# Patient Record
Sex: MALE | Race: OTHER
[De-identification: names, ages, dates, MRNs, and addresses within clinical notes are randomized per-mention and may not be internally consistent; named-entity substitution may affect disease eponyms.]

---

## 2019-08-05 ENCOUNTER — HOSPITAL ENCOUNTER (INPATIENT)
Dept: HOSPITAL 74 - JER | Age: 24
LOS: 1 days | Discharge: HOME | DRG: 225 | End: 2019-08-06
Payer: MEDICARE

## 2019-08-05 VITALS — BODY MASS INDEX: 20.7 KG/M2

## 2019-08-05 DIAGNOSIS — D72.829: ICD-10-CM

## 2019-08-05 DIAGNOSIS — R18.8: ICD-10-CM

## 2019-08-05 DIAGNOSIS — D72.825: ICD-10-CM

## 2019-08-05 DIAGNOSIS — R10.31: ICD-10-CM

## 2019-08-05 DIAGNOSIS — K35.33: Primary | ICD-10-CM

## 2019-08-05 LAB
ALBUMIN SERPL-MCNC: 4.3 G/DL (ref 3.4–5)
ALP SERPL-CCNC: 69 U/L (ref 45–117)
ALT SERPL-CCNC: 17 U/L (ref 13–61)
ANION GAP SERPL CALC-SCNC: 5 MMOL/L (ref 8–16)
APPEARANCE UR: CLEAR
AST SERPL-CCNC: 13 U/L (ref 15–37)
BACTERIA # UR AUTO: 2.7 /HPF
BASOPHILS # BLD: 0.3 % (ref 0–2)
BILIRUB SERPL-MCNC: 0.9 MG/DL (ref 0.2–1)
BILIRUB UR STRIP.AUTO-MCNC: NEGATIVE MG/DL
BUN SERPL-MCNC: 9.7 MG/DL (ref 7–18)
CALCIUM SERPL-MCNC: 9.4 MG/DL (ref 8.5–10.1)
CASTS URNS QL MICRO: 20 /LPF (ref 0–8)
CHLORIDE SERPL-SCNC: 104 MMOL/L (ref 98–107)
CO2 SERPL-SCNC: 30 MMOL/L (ref 21–32)
COLOR UR: YELLOW
CREAT SERPL-MCNC: 1 MG/DL (ref 0.55–1.3)
DEPRECATED RDW RBC AUTO: 12.6 % (ref 11.9–15.9)
EOSINOPHIL # BLD: 0 % (ref 0–4.5)
EPITH CASTS URNS QL MICRO: 3.3 /HPF
GLUCOSE SERPL-MCNC: 132 MG/DL (ref 74–106)
HCT VFR BLD CALC: 40.3 % (ref 35.4–49)
HGB BLD-MCNC: 13.9 GM/DL (ref 11.7–16.9)
INR BLD: 1.13 (ref 0.83–1.09)
KETONES UR QL STRIP: (no result)
LEUKOCYTE ESTERASE UR QL STRIP.AUTO: NEGATIVE
LYMPHOCYTES # BLD: 3.1 % (ref 8–40)
MCH RBC QN AUTO: 30.4 PG (ref 25.7–33.7)
MCHC RBC AUTO-ENTMCNC: 34.6 G/DL (ref 32–35.9)
MCV RBC: 87.9 FL (ref 80–96)
MONOCYTES # BLD AUTO: 6.1 % (ref 3.8–10.2)
NEUTROPHILS # BLD: 90.5 % (ref 42.8–82.8)
NITRITE UR QL STRIP: NEGATIVE
PH UR: >= 9 [PH] (ref 5–8)
PLATELET # BLD AUTO: 209 K/MM3 (ref 134–434)
PMV BLD: 8.7 FL (ref 7.5–11.1)
POTASSIUM SERPLBLD-SCNC: 4.6 MMOL/L (ref 3.5–5.1)
PROT SERPL-MCNC: 7.5 G/DL (ref 6.4–8.2)
PROT UR QL STRIP: (no result)
PROT UR QL STRIP: (no result)
PT PNL PPP: 13.3 SEC (ref 9.7–13)
RBC # BLD AUTO: 1 /HPF (ref 0–4)
RBC # BLD AUTO: 4.58 M/MM3 (ref 4–5.6)
SODIUM SERPL-SCNC: 139 MMOL/L (ref 136–145)
SP GR UR: 1.03 (ref 1.01–1.03)
UROBILINOGEN UR STRIP-MCNC: 1 MG/DL (ref 0.2–1)
WBC # BLD AUTO: 16.2 K/MM3 (ref 4–10)
WBC # UR AUTO: 1 /HPF (ref 0–5)

## 2019-08-05 PROCEDURE — 0DTJ8ZZ RESECTION OF APPENDIX, VIA NATURAL OR ARTIFICIAL OPENING ENDOSCOPIC: ICD-10-PCS

## 2019-08-05 RX ADMIN — SODIUM CHLORIDE, POTASSIUM CHLORIDE, SODIUM LACTATE AND CALCIUM CHLORIDE SCH MLS: 600; 310; 30; 20 INJECTION, SOLUTION INTRAVENOUS at 14:02

## 2019-08-05 RX ADMIN — SODIUM CHLORIDE, POTASSIUM CHLORIDE, SODIUM LACTATE AND CALCIUM CHLORIDE SCH MLS/HR: 600; 310; 30; 20 INJECTION, SOLUTION INTRAVENOUS at 09:09

## 2019-08-05 RX ADMIN — SODIUM CHLORIDE, POTASSIUM CHLORIDE, SODIUM LACTATE AND CALCIUM CHLORIDE SCH: 600; 310; 30; 20 INJECTION, SOLUTION INTRAVENOUS at 21:18

## 2019-08-05 NOTE — OP
Operative Note





- Note:


Operative Date: 08/05/19


Pre-Operative Diagnosis: acute appendicistis with localized peritonitis


Operation: laparoscopic appendectomy


Findings: 





inflammed appendix with purulent exudate 


Post-Operative Diagnosis: Same as Pre-op


Surgeon: Cameron Chong


Anesthesiologist/CRNA: Amparo Hilliard MD


Anesthesia: General, Local (0.5% marcaine )


Specimens Removed: appendix


Estimated Blood Loss (mls): 4


Drains, Volume Out (mls): 10 (simpson (removed))


Fluid Volume Replaced (mls): 400


Operative Report Dictated: Yes

## 2019-08-05 NOTE — PDOC
*Physical Exam





- Vital Signs


 Last Vital Signs











Temp Pulse Resp BP Pulse Ox


 


 98.5 F   110 H  17   135/87   100 


 


 08/05/19 05:38  08/05/19 05:38  08/05/19 05:38  08/05/19 05:38  08/05/19 05:38














- Physical Exam


General Appearance: Yes: Nourished, Appropriately Dressed.  No: Apparent 

Distress, Disheveled


HEENT: positive: EOMI, Normal Voice, Symmetrical


Neck: positive: Trachea midline.  negative: Tender, Carotid bruit


Respiratory/Chest: positive: Lungs Clear, Normal Breath Sounds.  negative: 

Chest Tender, Respiratory Distress, Accessory Muscle Use


Cardiovascular: positive: Regular Rhythm, Regular Rate, S1, S2.  negative: 

Murmur


Vascular Pulses: Dorsalis-Pedis (R): 2+, Doralis-Pedis (L): 2+


Gastrointestinal/Abdominal: positive: Normal Bowel Sounds, Flat, Soft, Other (

no rebound on my exam s/p toradol and IVF).  negative: Tender, Organomegaly, 

Pulsatile Mass, Guarding


Musculoskeletal: positive: Normal Inspection.  negative: CVA Tenderness


Extremity: positive: Normal Capillary Refill, Normal Inspection, Normal Range 

of Motion


Integumentary: positive: Normal Color, Dry, Warm.  negative: Cyanotic, Erythema

, Jaundice, Rash


Neurologic: positive: CNs II-XII NML intact, Fully Oriented, Alert, Normal 

Response, Motor Strength 5/5





ED Treatment Course





- LABORATORY


CBC & Chemistry Diagram: 


 08/05/19 06:20





 08/05/19 06:20





- ADDITIONAL ORDERS


Additional order review: 


 Laboratory  Results











  08/05/19





  06:20


 


Sodium  139


 


Potassium  4.6


 


Chloride  104


 


Carbon Dioxide  30


 


Anion Gap  5 L


 


BUN  9.7


 


Creatinine  1.0


 


Est GFR (CKD-EPI)AfAm  123.27


 


Est GFR (CKD-EPI)NonAf  106.36


 


Random Glucose  132 H


 


Calcium  9.4


 


Total Bilirubin  0.9


 


AST  13 L


 


ALT  17


 


Alkaline Phosphatase  69


 


Total Protein  7.5


 


Albumin  4.3








 











  08/05/19





  06:20


 


RBC  4.58


 


MCV  87.9


 


MCHC  34.6


 


RDW  12.6


 


MPV  8.7


 


Neutrophils %  90.5 H


 


Lymphocytes %  3.1 L


 


Monocytes %  6.1


 


Eosinophils %  0.0


 


Basophils %  0.3














- Medications


Given in the ED: 


ED Medications














Discontinued Medications














Generic Name Dose Route Start Last Admin





  Trade Name Freq  PRN Reason Stop Dose Admin


 


Acetaminophen  1,000 mg  08/05/19 06:08  08/05/19 06:29





  Ofirmev Injection -  IVPB  08/05/19 06:09  Not Given





  ONCE ONE   





     





     





     





     


 


Sodium Chloride  1,000 mls @ 1,000 mls/hr  08/05/19 06:08  08/05/19 06:28





  Normal Saline -  IV  08/05/19 07:07  1,000 mls/hr





  ASDIR STA   Administration





     





     





     





     


 


Ketorolac Tromethamine  30 mg  08/05/19 06:10  08/05/19 06:28





  Toradol Injection -  IVPUSH  08/05/19 06:11  30 mg





  ONCE ONE   Administration





     





     





     





     














Medical Decision Making





- Medical Decision Making





08/05/19 07:19


22y/o man with no significant pmh p/w 1 day of cramping, non-radiating, 

abdominal epigastric pain and n/v. 


-Sign out received from Dr. Andrews


-Baseline exam: +rebound, tachy to 110





-CBC with leukocytosis (16.2)


-CMP / Liver Studies / Glucose wnl / Cr 1.0


-Urine pending 


-CTAP w/contrast pending








08/05/19 07:34


-Pt without tenderness / rebound on my exam s/p medication and fluids. 

Endorsing some hunger. No nausea or vomiting at this time. 


-Headed to CT








08/05/19 08:27


-CTAP with appendicitis, possible perforation


-Zosyn 4.5 given


-Last meal 3AM, confirmed no medications


-NPO


-PT/INR, T&S, EKG ordered





08/05/19 08:33


-Spoke to Dr. Chong with surgery





08/05/19 08:36


-EKG NSR, Tachycardic to 106, Normal axis, QTc 443, Normal morphologies without 

ischemic changes











*DC/Admit/Observation/Transfer


Diagnosis at time of Disposition: 


Appendicitis


Qualifiers:


 Appendicitis type: acute appendicitis Acute appendicitis type: unspecified 

acute appendicitis type Qualified Code(s): K35.80 - Unspecified acute 

appendicitis








- Discharge Dispostion


Condition at time of disposition: Guarded


Decision to Admit order: Yes





- Referrals





- Patient Instructions





- Post Discharge Activity

## 2019-08-05 NOTE — EKG
Test Reason : 

Blood Pressure : ***/*** mmHG

Vent. Rate : 106 BPM     Atrial Rate : 106 BPM

   P-R Int : 144 ms          QRS Dur : 092 ms

    QT Int : 334 ms       P-R-T Axes : 071 051 063 degrees

   QTc Int : 443 ms

 

SINUS TACHYCARDIA

OTHERWISE NORMAL ECG

NO PREVIOUS ECGS AVAILABLE

Confirmed by VALDO HEBERT MD ([id number]) on 8/5/2019 11:48:52 AM

 

Referred By:             Confirmed By:VALDO HEBERT MD

## 2019-08-05 NOTE — PDOC
History of Present Illness





- General


Chief Complaint: Nausea/Vomiting


Stated Complaint: ABDOMINAL PAIN





- History of Present Illness


Initial Comments: 





08/05/19 06:06





24y/o M with no significant. hx presenting to ED with 1 day of cramping 

abdominal epigastric pain. He describes it as non-radiating pain that began 

yesterday morning. The pain last for 5-10 minutes and is relieved by 

vomiting.He has had 3-4 episodes of non-bloody emesis and has been unable to 

keep food down. His last bowel movement was yesterday at 5pm. He denies any 

urinary symptoms (dysuria, hematuria,urgency, frequency), diarrhea, bloody 

stools, fevers, chills, back pain.





08/05/19 06:11





08/05/19 06:30








Past History





- Past Medical History


Allergies/Adverse Reactions: 


 Allergies











Allergy/AdvReac Type Severity Reaction Status Date / Time


 


No Known Allergies Allergy   Verified 08/05/19 05:40











Home Medications: 


Ambulatory Orders





Amox-Tr/K Cl [Augmentin - 875Mg Tablet] 1 tab PO BID #14 tablet 08/05/19 


Oxycodone HCl/Acetaminophen [Percocet 5/325 -] 1 tab PO Q6H #40 tab MDD 5 08/05/ 19 








COPD: No


Other medical history: DENIES





- Immunization History


Immunization Up to Date: Yes





- Suicide/Smoking/Psychosocial Hx


Smoking History: Never smoked


Have you smoked in the past 12 months: No


Information on smoking cessation initiated: No


Hx Alcohol Use: No


Drug/Substance Use Hx: No





**Review of Systems





- Review of Systems


All Other Systems: Reviewed and Negative





*Physical Exam





- Vital Signs


 Last Vital Signs











Temp Pulse Resp BP Pulse Ox


 


 98.5 F   110 H  17   135/87   100 


 


 08/05/19 05:38  08/05/19 05:38  08/05/19 05:38  08/05/19 05:38  08/05/19 05:38














- Physical Exam


General Appearance: Yes: Nourished, Appropriately Dressed.  No: Apparent 

Distress


HEENT: negative: Scleral Icterus (R), Scleral Icterus (L)


Neck: positive: Trachea midline, Supple.  negative: Tender


Respiratory/Chest: positive: Lungs Clear, Normal Breath Sounds.  negative: 

Chest Tender, Respiratory Distress, Accessory Muscle Use, Labored Respiration, 

Wheezing


Cardiovascular: positive: Regular Rhythm, Regular Rate, S1, S2.  negative: Edema

, JVD


Vascular Pulses: Dorsalis-Pedis (R): 2+, Doralis-Pedis (L): 2+


Gastrointestinal/Abdominal: positive: Normal Bowel Sounds, Soft, Rebound.  

negative: Protuberent, Distended, Guarding, Tenderness


Musculoskeletal: positive: Normal Inspection.  negative: CVA Tenderness, 

Decreased Range of Motion


Extremity: positive: Normal Capillary Refill, Normal Inspection, Normal Range 

of Motion.  negative: Pedal Edema


Integumentary: positive: Normal Color, Dry, Warm


Neurologic: positive: Fully Oriented, Alert, Normal Mood/Affect, Normal Response





ED Treatment Course





- LABORATORY


CBC & Chemistry Diagram: 


 08/05/19 06:20





 08/05/19 06:20





Medical Decision Making





- Medical Decision Making





08/05/19 06:18





24y/o M with no significant. hx presenting to ED with 1 day of cramping 

abdominal epigastric pain.





Ddx: Appendicitis vs Nephrolithiasis vs gastroenteritis





-Fluids (IV normal saline)


-cbc,cmp, ua


-CT abdomen and pelvis with contrast.








08/05/19 06:30











08/05/19 06:56








*DC/Admit/Observation/Transfer


Diagnosis at time of Disposition: 


Appendicitis


Qualifiers:


 Appendicitis type: acute appendicitis Acute appendicitis type: unspecified 

acute appendicitis type Qualified Code(s): K35.80 - Unspecified acute 

appendicitis








- Discharge Dispostion


Disposition: HOME


Condition at time of disposition: Good





- Referrals





- Patient Instructions





- Post Discharge Activity

## 2019-08-05 NOTE — HP
Admitting History and Physical





- Admission


Chief Complaint: abdominal pain


History of Present Illness: 





24yo male with no significant PMH presented to ED with 1 day of cramping 

abdominal epigastric pain. He describes it as non-radiating pain that began 

yesterday morning. The pain last for 5-10 minutes and is relieved by 

vomiting.He has had 3-4 episodes of non-bloody emesis and has been unable to 

keep food down. His last bowel movement was yesterday at 5pm. He denies any 

urinary symptoms (dysuria, hematuria,urgency, frequency), diarrhea, bloody 

stools, fevers, chills, back pain.


History Source: Patient, Medical Record


Limitations to Obtaining History: No Limitations





- Smoking History


Smoking history: Never smoked


Have you smoked in the past 12 months: No





- Alcohol/Substance Use


Hx Alcohol Use: No





Home Medications





- Allergies


Allergies/Adverse Reactions: 


 Allergies











Allergy/AdvReac Type Severity Reaction Status Date / Time


 


No Known Allergies Allergy   Verified 08/05/19 05:40














- Home Medications


Home Medications: 


Ambulatory Orders





NK [No Known Home Medication]  08/05/19 











Review of Systems





- Review of Systems


Constitutional: denies: Chills, Fever


Eyes: denies: Blind Spots, Recent Change in Vision


HENT: denies: Difficult Swallowing, Throat Pain


Neck: denies: Decreased ROM, Tenderness


Cardiovascular: denies: Chest Pain, Palpitations


Respiratory: denies: Cough, SOB, SOB on Exertion


Gastrointestinal: reports: Abdominal Pain.  denies: Constipation, Diarrhea


Genitourinary: denies: Burning, Discharge, Dysuria


Musculoskeletal: denies: Crepitus, Muscle Pain


Integumentary: denies: Pallor, Rash


Neurological: denies: Seizure, Syncope, Tremors


Endocrine: denies: Unexplained Weight Gain, Unexplained Weight Loss


Hematology/Lymphatic: denies: Easily Bruised, Excessive Bleeding


Psychiatric: denies: Anxiety, Depression





Physical Examination


Vital Signs: 


 Vital Signs











Temperature  98.5 F   08/05/19 05:38


 


Pulse Rate  110 H  08/05/19 05:38


 


Respiratory Rate  17   08/05/19 05:38


 


Blood Pressure  135/87   08/05/19 05:38


 


O2 Sat by Pulse Oximetry (%)  100   08/05/19 05:38











Constitutional: Yes: Well Nourished, No Distress, Calm


Eyes: Yes: Conjunctiva Clear, EOM Intact


HENT: Yes: Atraumatic, Normocephalic


Neck: Yes: Supple, Trachea Midline


Cardiovascular: Yes: Regular Rate and Rhythm, S1, S2


Respiratory: Yes: Regular, CTA Bilaterally


Gastrointestinal: Yes: Normal Bowel Sounds, Soft, Tenderness, Tenderness, 

Rebound (RLQ)


...Rectal Exam: No: Deferred


Renal/: No: CVA Tenderness - Left, CVA Tenderness - Right


Breast(s): No: Mass, Nipple Inversion


Musculoskeletal: No: Muscle Pain, Muscle Weakness


Extremities: No: Cool, Cyanosis


Edema: No


Peripheral Pulses WNL: Yes


Peripheral Pulses: Left Radial: 2+, Right Radial: 2+, Left Doralis Pedis: 2+, 

Right Dorsalis Pedis: 2+, Left Femoral: 2+, Right Femoral: 2+


Integumentary: No: Bruising, Skin Tear


Neurological: Yes: Alert, Oriented


Psychiatric: Yes: Alert, Oriented


Labs: 


 CBC, BMP





 08/05/19 06:20 





 08/05/19 06:20 











Imaging





- Results


Cat Scan: Report Reviewed, Image Reviewed





Problem List





- Problems


(1) Acute appendicitis with localized peritonitis


Assessment/Plan: 


24yo male with acute appendicitis X1 day





NPO and IVF hydartion 


OR for emergency surgery 


Discussed with patient risks, benefits and alternatives of laparoscopic 

possible open appendectomy, including but not limited to bleeding, infection, 

injury to adjacent structures, leak or injury, intraabdominal abscess, 

incisional hernia, need for further procedures, death; alternatives include 

antibiotics, delayed or no surgery - risks of this include failure of 

nonoperative therapy, perforation, sepsis, recurrence, death.


Patient desires to proceed with operation - will take to OR for above. Informed 

consent signed for same.


Code(s): K35.30 - ACUTE APPENDICITIS WITH LOC PERITONITIS, W/O PERF OR GANGR   


Qualifiers: 


   Appendicitis gangrene presence: without gangrene   Appendicitis perforation 

presence: unspecified whether perforation present   Appendicitis abscess 

presence: unspecified whether abscess present   Qualified Code(s): K35.30 - 

Acute appendicitis with localized peritonitis, without perforation or gangrene 

  





(2) Abdominal pain in male


Code(s): R10.9 - UNSPECIFIED ABDOMINAL PAIN   





(3) Leukocytosis


Code(s): D72.829 - ELEVATED WHITE BLOOD CELL COUNT, UNSPECIFIED   


Qualifiers: 


   Leukocytosis type: bandemia   Qualified Code(s): D72.825 - Bandemia   





(4) Appendiceal abscess


Code(s): K35.33 - ACUTE APPENDICITIS WITH PERF AND LOC PERITONITIS, WITH ABSCS

## 2019-08-05 NOTE — PDOC
Attending Attestation





- Resident


Resident Name: Hnerik Andrews





- ED Attending Attestation


I have performed the following: I have examined & evaluated the patient, The 

case was reviewed & discussed with the resident, I agree w/resident's findings 

& plan





- HPI


HPI: 





08/05/19 06:09


Pt comes with on off left sided abd pain.





- Physicial Exam


PE: 





08/05/19 06:10


Agree with resident exam





- Medical Decision Making





08/05/19 06:10


Hydration, toradol, labs, UA , reeval.


Pt will be signed out to the day team.





08/05/19 06:53


Pt has WBC of 16 with 90% shift; he may have appendicitis.


We are awaiting CT abd/pelvis and chem





08/09/19 22:13


Pt signed out

## 2019-08-05 NOTE — CONSULT
Consult


Consult Specialty:: General Surgery





- History Source


History Provided By: Patient, Medical Record


Limitations to Obtaining History: No Limitations





- Alcohol/Substance Use


Hx Alcohol Use: No





- Smoking History


Smoking history: Never smoked


Have you smoked in the past 12 months: No





Home Medications





- Allergies


Allergies/Adverse Reactions: 


 Allergies











Allergy/AdvReac Type Severity Reaction Status Date / Time


 


No Known Allergies Allergy   Verified 08/05/19 05:40














- Home Medications


Home Medications: 


Ambulatory Orders





NK [No Known Home Medication]  08/05/19 











Physical Exam


Vital Signs: 


 Vital Signs











Temperature  98.5 F   08/05/19 05:38


 


Pulse Rate  110 H  08/05/19 05:38


 


Respiratory Rate  17   08/05/19 05:38


 


Blood Pressure  135/87   08/05/19 05:38


 


O2 Sat by Pulse Oximetry (%)  100   08/05/19 05:38











Labs: 


 CBC, BMP





 08/05/19 06:20 





 08/05/19 06:20

## 2019-08-06 VITALS — TEMPERATURE: 98.2 F | SYSTOLIC BLOOD PRESSURE: 123 MMHG | HEART RATE: 88 BPM | DIASTOLIC BLOOD PRESSURE: 68 MMHG

## 2019-08-06 RX ADMIN — SODIUM CHLORIDE, POTASSIUM CHLORIDE, SODIUM LACTATE AND CALCIUM CHLORIDE SCH MLS/HR: 600; 310; 30; 20 INJECTION, SOLUTION INTRAVENOUS at 03:38

## 2019-08-06 NOTE — DS
Physical Examination


Vital Signs: 


 Vital Signs











Temperature  98.2 F   08/06/19 09:54


 


Pulse Rate  88   08/06/19 09:54


 


Respiratory Rate  20   08/06/19 09:54


 


Blood Pressure  123/68   08/06/19 09:54


 


O2 Sat by Pulse Oximetry (%)  99   08/05/19 21:00











Findings/Remarks: 





stable overnight. no complaint this morning 


Constitutional: Yes: Well Nourished, No Distress, Calm


Eyes: Yes: Conjunctiva Clear, EOM Intact


HENT: Yes: Atraumatic, Normocephalic


Neck: Yes: Supple, Trachea Midline


Cardiovascular: Yes: Regular Rate and Rhythm, S1, S2


Respiratory: Yes: Regular, CTA Bilaterally


Gastrointestinal: Yes: Normal Bowel Sounds, Soft.  No: Tenderness


...Rectal Exam: Yes: Deferred


Renal/: No: CVA Tenderness - Left, CVA Tenderness - Right


Breast(s): No: Mass, Skin Changes


Musculoskeletal: No: Muscle Pain, Muscle Weakness


Extremities: No: Cool, Cyanosis


Edema: No


Peripheral Pulses WNL: Yes


Peripheral Pulses: Left Radial: 2+, Right Radial: 2+, Left Doralis Pedis: 2+, 

Right Dorsalis Pedis: 2+, Left Femoral: 2+, Right Femoral: 2+


Integumentary: No: Jaundice, Rash


Wound/Incision: Yes: Clean/Dry, Well Approximated, Dressing Dry and Intact


Neurological: Yes: Alert, Oriented


Psychiatric: Yes: Alert, Oriented


Labs: 


 CBC, BMP





 08/05/19 06:20 





 08/05/19 06:20 











Discharge Summary


Reason For Visit: APPENDICITIS





acute appendicitis 


Procedures: Principal: laparoscopic appendectomy


Hospital Course: 





admitted for emergency procedure. uneventful surgery. stable for discharge home


Condition: Good





- Instructions


Diet, Activity, Other Instructions: 


Postoperative instructions: You had a laparoscopic appendectomy on 8/5/2019 by 

Dr. Cameron Chong of [facility].


 


Activity: Resume your usual activities gradually, but no heavy exertion or 

lifting more than 10-15 pounds for 1 month. You may shower daily starting then, 

just pat the incision areas dry. No bath or swimming until skin incisions have 

healed. Eat lightly at first, but advance to your usual diet as tolerated.


 


Pain: For pain, you may use and alternate Tylenol (acetaminophen) 1-2 pills and/

or ibuprofen 200 mg (1-3 pills) every 6 hours each as needed; this means that 

you can take one OR the other at 3-hour intervals. If you are prescribed a 

Tylenol/narcotic combination for severe pain, use it instead of plain Tylenol 

as needed and switch back when your pain starts decreasing. Do not take more 

than 4000mg of acetaminophen in a day. Take medications as prescribed or 

indicated on the labeling.


 


Follow-up: Call Dr. Chong' office at [phone] to make your postop 

appointment (Wednesday in approximately 2 weeks after surgery). Clinic is held 

in the [facility] on the first floor of [facility].


 


Call the office if you have:


* increasing pain not responsive to pain medication


* fever of 101F or higher


* vomiting


* unusual or increasing bleeding or drainage from wounds


* increasing redness or swelling at wound sites


* inability to urinate





Also, see your primary medical doctor within 1-2 weeks.


Disposition: HOME





- Home Medications


Comprehensive Discharge Medication List: 


Ambulatory Orders





Amox-Tr/K Cl [Augmentin - 875Mg Tablet] 1 tab PO BID #14 tablet 08/05/19 


Oxycodone HCl/Acetaminophen [Percocet 5/325 -] 1 tab PO Q6H #40 tab MDD 5 08/05/ 19

## 2019-08-07 NOTE — PATH
Surgical Pathology Report



Patient Name:  NIKOLE FRANK

Accession #:  [id number]

Med. Rec. #:  [id number]                                                        

   /Age/Gender:  1995 (Age: 23) / M

Account:  [id number]                                                          

             Location: [address] [place] [address]

Taken:  2019

Received:  2019

Reported:  2019

Physicians:  PHYSICIAN EMERGENCY DEPT

Cameron Chong M.D.

  



Specimen(s) Received

 APPENDIX 





Clinical History

Appendicitis







Final Diagnosis

APPENDIX, LAPAROSCOPIC APPENDECTOMY:

ACUTE APPENDICITIS AND PERIAPPENDICITIS.

ACUTE SEROSITIS.





***Electronically Signed***

Lyric Barillas M.D.





Gross Description

Received in formalin, labeled "appendix," is a 7 cm. in length vermiform

appendix with a stapled margin of resection and moderate attached fat. The

serosa is tan-grey with abundant attached exudate. Sectioning reveals a markedly

hemorrhagic lumen. The wall of the appendix averages 0.1 cm. in thickness.

Representative sections are submitted in one cassette. 





[facility]

## 2019-08-07 NOTE — OP
DATE OF OPERATION:  08/05/2019

 

PREOPERATIVE DIAGNOSIS:  Acute appendicitis with localized peritonitis in right lower

quadrant.  

 

POSTOPERATIVE DIAGNOSIS:  Acute appendicitis with localized peritonitis in right

lower quadrant.  

 

PROCEDURE:  Laparoscopic appendectomy.

 

ATTENDING SURGEON:  Cameron Chong MD

 

ASSISTANT:  No one.

 

ANESTHESIOLOGIST:  Amparo Hilliard MD 

 

ANESTHESIA TYPE:  General with local, local consisting of 0.5% Marcaine a total of 10

mL given at the port sites.  

 

ESTIMATED BLOOD LOSS:  4 mL.

 

INTRAVENOUS FLUID REPLACED:  400 mL.

 

DRAINED FLUID:  Urine, 10 mL, which was removed postoperatively.  

 

SPECIMEN:  Appendix.  

 

BRIEF FINDINGS:  Patient had an inflamed appendix with purulent exudate on the

surface. 

.  

INDICATIONS:  Patient is a 23-year-old male presenting with right lower quadrant pain

x3 days, worsening low-grade fevers, and a leukocytosis of 16,000.  He was counseled

regarding risks, benefits, and alternatives to surgical appendectomy.  He signed

informed consent and was taken to the procedure.  

 

DESCRIPTION OF PROCEDURE:  The patient was brought to the operating room, was placed

in supine position on the operating table with the left arm tucked and the right arm

extended at 90 degrees perpendicular to the bodys midline axis at the shoulder. 

Lower extremities had SCDs placed to compression.  He was given intravenous

antibiotics prior to the start of surgery.  He was induced with general anesthesia,

endotracheally intubated.  At which point, after sterile prep and drape of the

anterior abdominal wall, we began with a formal time-out identifying the operative

site and the procedure.  

 

With all parties in agreement, we began first with a supraumbilical entry into the

abdomen.  A Radha entry was inscribed in the skin and incised with a 15-blade

scalpel, and deepened and widened through subcutaneous tissue with Bovie cautery to

the anterior rectus fascia.  The fascia was elevated into the surgical wound.  A

blunt entry was made with a clamp into the abdomen, and a digit was used to clear the

intraabdominal viscera from the port site.  A 12-mm Radha trocar was then installed

into the abdomen, and pneumoperitoneum was established to 15 mmHg.  

 

At which point, we began first with an inspection of the abdomen and the right lower

quadrant.  With the patient in steep Trendelenburg, we installed additional port

sites at the suprapubic position as well as the left lower quadrant.  This allowed

for dissection of what appeared to be a grossly inflamed appendix with purulent

exudate on its surface.  There was a significant amount of ascites in the pelvis

which was suctioned free.  The base of the appendix was identified at the termination

of the taeniae coli on the cecum.  

 

At which point, a plane was developed between the mesoappendix and the appendix body

itself.  An Endo MAXIMILIANO stapler was introduced into the abdomen from the umbilical port,

and stapled across the base after the camera was resided to the left lower quadrant. 

After controlling the base, the appendix itself was dissected with the LigaSure

device along the mesoappendix.  Hemostasis was obtained in this manner.  Additional

purulent exudate was scraped from the wall and then retrieved with an EndoCatch bag

10 mm from the umbilical port, as well.  The specimen was then passed off for final

pathologic diagnosis.  

 

We then removed ports under direct visualization and relieved the pneumoperitoneum. 

The Radha entry port was ablated with a figure-of-eight 0 Vicryl to close the

abdomen.  The skin was then closed with Dermabond after 4-0 Vicryl was used to

approximate the skin edges at the deep dermis.  The skin was cleaned.  Sterile

dressings in the form of Dermabond were placed.  Counts were correct prior to the

closure of the abdomen.  

 

 

MD DELL Feldman/[id number]

DD: 08/06/2019 20:55

DT: 08/07/2019 07:02

Job #:  [id number]